# Patient Record
Sex: MALE | URBAN - METROPOLITAN AREA
[De-identification: names, ages, dates, MRNs, and addresses within clinical notes are randomized per-mention and may not be internally consistent; named-entity substitution may affect disease eponyms.]

---

## 2023-10-13 ENCOUNTER — EMERGENCY (EMERGENCY)
Age: 23
LOS: 1 days | Discharge: HOME | End: 2023-10-13
Attending: STUDENT IN AN ORGANIZED HEALTH CARE EDUCATION/TRAINING PROGRAM | Admitting: STUDENT IN AN ORGANIZED HEALTH CARE EDUCATION/TRAINING PROGRAM
Payer: OTHER GOVERNMENT

## 2023-10-13 VITALS
DIASTOLIC BLOOD PRESSURE: 81 MMHG | RESPIRATION RATE: 18 BRPM | OXYGEN SATURATION: 96 % | SYSTOLIC BLOOD PRESSURE: 137 MMHG | HEART RATE: 91 BPM | TEMPERATURE: 98 F

## 2023-10-13 VITALS
HEART RATE: 97 BPM | DIASTOLIC BLOOD PRESSURE: 84 MMHG | OXYGEN SATURATION: 98 % | RESPIRATION RATE: 18 BRPM | TEMPERATURE: 98 F | SYSTOLIC BLOOD PRESSURE: 138 MMHG | WEIGHT: 211.97 LBS

## 2023-10-13 PROCEDURE — 99283 EMERGENCY DEPT VISIT LOW MDM: CPT

## 2023-10-13 RX ORDER — CLOBAZAM 10 MG/1
1 TABLET ORAL
Qty: 90 | Refills: 0
Start: 2023-10-13 | End: 2023-11-11

## 2023-10-13 RX ORDER — LAMOTRIGINE 25 MG/1
1 TABLET, ORALLY DISINTEGRATING ORAL
Qty: 60 | Refills: 0
Start: 2023-10-13 | End: 2023-11-11

## 2023-10-13 RX ORDER — LAMOTRIGINE 25 MG/1
1 TABLET, ORALLY DISINTEGRATING ORAL
Qty: 30 | Refills: 0
Start: 2023-10-13 | End: 2023-11-11

## 2023-10-13 NOTE — ED PROVIDER NOTE - PATIENT PORTAL LINK FT
You can access the FollowMyHealth Patient Portal offered by [facility] by registering at the following website: [email or web address]. By joining [email or web address]’s FollowMyHealth portal, you will also be able to view your health information using other applications (apps) compatible with our system.

## 2023-10-13 NOTE — ED PROVIDER NOTE - OBJECTIVE STATEMENT
21 yo M with h/o seizure disorder who presents to the ED requesting medication refill. Pt takes 23 yo M with h/o seizure disorder who presents to the ED requesting medication refill. Pt takes lamotrigine and clobazam for seizure disorder. he currently lives in [place] but is in [place] for 6 months while his father has a temporary job up here. They were trying to get refills through physician in [place] but that physician was unable to prescribe medication to [place]. He notes that he has doses for tomorrow morning but after that is out. Takes lamotrigine 200 mg once a day and clobazam 10 mg in the morning and 20 mg at night. Has small pause seizures throughout the day but has not had a grand mal seizure in multiple years. Pt has no other current medical complaints.

## 2023-10-13 NOTE — ED ADULT NURSE NOTE - OBJECTIVE STATEMENT
Patient received in wellness, exam room 1. Patient A&Ox3 and ambulatory at baseline. Patient presents to the ED for medications refill. 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 Patient states he is visiting from [place] and running low on his Clobazam (10mg) and Lamotrigine (200mg); patient states he just needs a refill of medications for remaining of trip. Patient denies headache, dizziness, nausea/vomiting, fever/chills, and pain. Patient offers no complaints at this time. Respirations even and unlabored, no signs/symptoms of acute distress; patient denies dyspnea, shortness of breath, and chest pain. Patient is stable at this time. Steady gait observed.

## 2023-10-13 NOTE — ED PROVIDER NOTE - NSFOLLOWUPCLINICS_GEN_ALL_ED_FT
[facility]  Neurology  [address] [place] [address] [address]rd [place] [address]  Phone: [phone]  Fax:

## 2023-10-13 NOTE — ED POST DISCHARGE NOTE - DETAILS
pt's father called for pt's prescription of clobazam not available at the previous pharmacy, and requesting to send to another [facility]. also lamotrigine has to be one tablet twice a day instead of once a day as the prescription was "can take up to 2 times a day as needed". medications have been previously verified by ER team. prescription sent and verified with pharmacy on the phone.

## 2023-10-13 NOTE — ED ADULT TRIAGE NOTE - CHIEF COMPLAINT QUOTE
Pt brought to Adult ED from [facility] ED, accompanied by father. Pt is visiting from [place] and ran out of medication Clobazam. Pt requesting medication refill until return back to [place]. Phx seizure, stroke with left leg residual weakness. Denies any medical complaints.

## 2023-10-13 NOTE — ED PROVIDER NOTE - PROGRESS NOTE DETAILS
Dr. Reyes: Pt signed out to me by Dr. Gimenez. Pt home dose medication confirmed with outpatient pharmacy. Will send rx for seizure prevention and recommend continued outpatient follow up.

## 2023-10-13 NOTE — ED PEDIATRIC TRIAGE NOTE - CHIEF COMPLAINT QUOTE
23yo male visiting from [place] here for medication refill, pmh seizure disorder and stroke, no seizure activity today, nka,

## 2023-10-13 NOTE — ED PROVIDER NOTE - CLINICAL SUMMARY MEDICAL DECISION MAKING FREE TEXT BOX
Dosages for medication verified with [facility] in [place] ([phone]). Clobazam last refilled on 9/7/2023 with 30 day supply. 21 yo M with h/o seizure disorder who presents to the ED requesting refill of seizure medication lamotrigine and clobazam. Lives in [place] but is in [place] for 6 months and medication runs out tomorrow.   Dosages for medication verified with [facility] in [place] ([phone])- lamotrigine 200 mg once a day and clobazam, 10 mg in the morning and 20 mg at night. Clobazam last refilled on 9/7/2023 with 30 day supply.

## 2023-10-13 NOTE — ED ADULT NURSE NOTE - NSFALLUNIVINTERV_ED_ALL_ED
Bed/Stretcher in lowest position, wheels locked, appropriate side rails in place/Call bell, personal items and telephone in reach/Instruct patient to call for assistance before getting out of bed/chair/stretcher/Non-slip footwear applied when patient is off stretcher/[place] to call system/Physically safe environment - no spills, clutter or unnecessary equipment/Purposeful proactive rounding/Room/bathroom lighting operational, light cord in reach

## 2023-10-13 NOTE — ED ADULT NURSE NOTE - CHIEF COMPLAINT QUOTE
Pt brought to Adult ED from [facility] ED, accompanied by father. Pt is visiting from [place] and ran out of medication Clobazam. Pt requesting medication refill until return back to [place]. Phx seizure, stroke with left leg residual weakness. Denies any medical complaints.

## 2023-10-13 NOTE — ED STATDOCS - OBJECTIVE STATEMENT
23 yo with seizure disorder visiting from [place] who is running out of his seizure medication.    I performed a medical screening examination and determined this patient to be medically stable and will transfer to the [facility] adult ED for further care. heart and lung exam done and both did not reveal concerns for immediate intervention. Request for transfer relayed to [facility] ED attending who accepted case. Process explained to patient/parent prior to transfer.

## 2023-11-01 PROBLEM — R56.9 UNSPECIFIED CONVULSIONS: Chronic | Status: ACTIVE | Noted: 2023-10-13

## 2023-11-02 PROBLEM — Z00.00 ENCOUNTER FOR PREVENTIVE HEALTH EXAMINATION: Status: ACTIVE | Noted: 2023-11-02

## 2023-11-07 ENCOUNTER — TRANSCRIPTION ENCOUNTER (OUTPATIENT)
Age: 23
End: 2023-11-07

## 2023-11-07 ENCOUNTER — APPOINTMENT (OUTPATIENT)
Dept: NEUROLOGY | Facility: CLINIC | Age: 23
End: 2023-11-07
Payer: OTHER GOVERNMENT

## 2023-11-07 ENCOUNTER — NON-APPOINTMENT (OUTPATIENT)
Age: 23
End: 2023-11-07

## 2023-11-07 VITALS
HEIGHT: 76 IN | SYSTOLIC BLOOD PRESSURE: 142 MMHG | HEART RATE: 86 BPM | BODY MASS INDEX: 24.36 KG/M2 | WEIGHT: 200 LBS | DIASTOLIC BLOOD PRESSURE: 78 MMHG

## 2023-11-07 DIAGNOSIS — G40.911 EPILEPSY, UNSPECIFIED, INTRACTABLE, WITH STATUS EPILEPTICUS: ICD-10-CM

## 2023-11-07 PROCEDURE — 99205 OFFICE O/P NEW HI 60 MIN: CPT

## 2023-11-07 PROCEDURE — 95816 EEG AWAKE AND DROWSY: CPT

## 2023-11-07 RX ORDER — DIVALPROEX SODIUM 500 1/1
500 TABLET, EXTENDED RELEASE ORAL
Qty: 540 | Refills: 2 | Status: ACTIVE | COMMUNITY
Start: 2023-11-07 | End: 1900-01-01

## 2023-11-07 RX ORDER — DIVALPROEX SODIUM 500 MG/1
500 TABLET, DELAYED RELEASE ORAL
Refills: 0 | Status: DISCONTINUED | COMMUNITY
End: 2023-11-07

## 2023-11-08 LAB
ALBUMIN SERPL ELPH-MCNC: 4.5 G/DL
ALP BLD-CCNC: 77 U/L
ALT SERPL-CCNC: 38 U/L
ANION GAP SERPL CALC-SCNC: 13 MMOL/L
AST SERPL-CCNC: 37 U/L
BILIRUB SERPL-MCNC: 0.2 MG/DL
BUN SERPL-MCNC: 12 MG/DL
CALCIUM SERPL-MCNC: 9.8 MG/DL
CHLORIDE SERPL-SCNC: 105 MMOL/L
CO2 SERPL-SCNC: 25 MMOL/L
CREAT SERPL-MCNC: 1.02 MG/DL
EGFR: 107 ML/MIN/1.73M2
GLUCOSE SERPL-MCNC: 73 MG/DL
HCT VFR BLD CALC: 43.7 %
HGB BLD-MCNC: 14.6 G/DL
MCHC RBC-ENTMCNC: 32.7 PG
MCHC RBC-ENTMCNC: 33.4 GM/DL
MCV RBC AUTO: 97.8 FL
PLATELET # BLD AUTO: 155 K/UL
POTASSIUM SERPL-SCNC: 4.6 MMOL/L
PROT SERPL-MCNC: 6.6 G/DL
RBC # BLD: 4.47 M/UL
RBC # FLD: 13.8 %
SODIUM SERPL-SCNC: 143 MMOL/L
VALPROATE SERPL-MCNC: 100 UG/ML
WBC # FLD AUTO: 6.13 K/UL

## 2023-11-12 ENCOUNTER — NON-APPOINTMENT (OUTPATIENT)
Age: 23
End: 2023-11-12

## 2023-11-12 LAB
CLOBAZAM + NOR PNL SERPL: 166 NG/ML
DESMETHYLCLOBAZAM: 441 NG/ML
LAMOTRIGINE SERPL-MCNC: 15.3 UG/ML

## 2023-11-13 ENCOUNTER — TRANSCRIPTION ENCOUNTER (OUTPATIENT)
Age: 23
End: 2023-11-13

## 2023-12-20 ENCOUNTER — EMERGENCY (EMERGENCY)
Facility: HOSPITAL | Age: 23
LOS: 1 days | Discharge: HOME | End: 2023-12-20
Attending: EMERGENCY MEDICINE | Admitting: EMERGENCY MEDICINE
Payer: OTHER GOVERNMENT

## 2023-12-20 VITALS
DIASTOLIC BLOOD PRESSURE: 72 MMHG | SYSTOLIC BLOOD PRESSURE: 146 MMHG | RESPIRATION RATE: 16 BRPM | OXYGEN SATURATION: 99 % | TEMPERATURE: 98 F | HEART RATE: 85 BPM

## 2023-12-20 PROCEDURE — 25622 CLTX CARPL SCPHD FX W/O MNPJ: CPT | Mod: 54,RT

## 2023-12-20 PROCEDURE — 99284 EMERGENCY DEPT VISIT MOD MDM: CPT | Mod: 57

## 2023-12-20 NOTE — ED PROVIDER NOTE - CLINICAL SUMMARY MEDICAL DECISION MAKING FREE TEXT BOX
23 year old male presenting with a diagnosed right scaphoid fracture and nondisplaced radial fracture.  States he was seen in Urgent Care last night, sent for an X-Ray and called today to have a splint applied.  Patient reports he was walking at the gym and accidentally tripped, landed onto right outstretched hands.  Denies weakness, numbness or tingling.  Limited range of motion due to pain but is able to move extremity.  Denies any other injuries or trauma other than as stated.  Denies any other acute complaints.    Reviewed X-Ray images and Official report provided by Patient's father.  Consistent with Right nondisplaced radial and scaphoid fracture.  Patient took Tylenol prior to arrival, reports pain is controlled at this time.    Will splint and set up follow up appointment.

## 2023-12-20 NOTE — ED PROVIDER NOTE - PATIENT PORTAL LINK FT
You can access the FollowMyHealth Patient Portal offered by [facility] by registering at the following website: [email or web address]. By joining [employer]’s FollowMyHealth portal, you will also be able to view your health information using other applications (apps) compatible with our system. You can access the FollowMyHealth Patient Portal offered by [facility] by registering at the following website: [email or web address]. By joining [employer]’s FollowMyHealth portal, you will also be able to view your health information using other applications (apps) compatible with our system.

## 2023-12-20 NOTE — ED PROVIDER NOTE - NSFOLLOWUPINSTRUCTIONS_ED_ALL_ED_FT
You have a fracture of your right wrist.   Keep the splint on as discussed for immobilization until you follow up with an Orthopedic Hand Specialist. You have a fracture of your right wrist.   Keep the splint on as discussed for immobilization until you follow up with an Orthopedic Hand Specialist as scheduled within the next week.    You may apply ice with a barrier protection 3-4 times daily for 10-15 minutes at a time.  Unless there are any contraindications, you may take Tylenol and/or Motrin as needed for pain.  Take as instructed on the bottle.    Follow up with your Primary Care Physician as needed.    Return to the Emergency Department immediately for any weakness, numbness or tingling, worsening pain, or any other acute complaints, or concerns. You have a fracture of your right wrist.   Keep the splint on as discussed for immobilization until you follow up with an Orthopedic Hand Specialist as scheduled within the next week.    You may apply ice with a barrier protection 3-4 times daily for 10-15 minutes at a time.  Unless there are any contraindications, you may take Tylenol and/or Motrin as needed for pain.  Take as instructed on the bottle.  Avoid heavy lifting.  Avoid strenuous activity. Keep extremity elevated.    Follow up with your Primary Care Physician as needed.    Return to the Emergency Department immediately for any weakness, numbness or tingling, worsening pain, or any other acute complaints, or concerns.

## 2023-12-20 NOTE — ED PROVIDER NOTE - MUSCULOSKELETAL MINIMAL EXAM
Right wrist/hand; overlying skin intact without erythema or ecchymosis.  Wrist tender and swollen with snuffbox tenderness and tenderness over the dorsal aspect, no crepitus.  Limited active ROM due to pain but able to perform flexion and extension.  Pain with flexion and ulnar/radial deviation.  Opposition intact.  Full  strength.  Distal sensation intact, peripheral pulses present.  Capillary refill < 2 seconds.  Remaining joints and RUE intact with no other injuries or trauma, no other pain or tenderness.

## 2023-12-20 NOTE — ED ADULT TRIAGE NOTE - CHIEF COMPLAINT QUOTE
Pt. states he fell last night while walking into the gym. Sent by  for fracture to right wrist and elbow. Denies head trauma or LOC.

## 2023-12-20 NOTE — ED ADULT NURSE NOTE - OBJECTIVE STATEMENT
pt received to intake with father and is ambulatory. respirations even and unlabored. pt assessed by provider and D/C prior to further evaluation.

## 2023-12-20 NOTE — ED PROVIDER NOTE - OBJECTIVE STATEMENT
23 year old male presenting with a diagnosed right scaphoid fracture and nondisplaced radial fracture.  States he was seen in Urgent Care last night, sent for an X-Ray and called today to have a splint applied.  Patient reports he was walking at the gym and accidentally tripped, landed onto right outstretched hands.  Denies weakness, numbness or tingling.  Limited range of motion due to pain but is able to move extremity.  Denies any other injuries or trauma other than as stated.  Denies any other acute complaints.

## 2023-12-20 NOTE — ED PROVIDER NOTE - PROGRESS NOTE DETAILS
CECILIA Troy:    Follow up Ortho appointment provided with Patient.  Splint applied.  Patient comfortable with discharge home.

## 2023-12-22 ENCOUNTER — APPOINTMENT (OUTPATIENT)
Dept: ORTHOPEDIC SURGERY | Facility: CLINIC | Age: 23
End: 2023-12-22
Payer: OTHER GOVERNMENT

## 2023-12-22 ENCOUNTER — NON-APPOINTMENT (OUTPATIENT)
Age: 23
End: 2023-12-22

## 2023-12-22 VITALS
SYSTOLIC BLOOD PRESSURE: 138 MMHG | WEIGHT: 210 LBS | HEIGHT: 76 IN | HEART RATE: 75 BPM | DIASTOLIC BLOOD PRESSURE: 81 MMHG | BODY MASS INDEX: 25.57 KG/M2

## 2023-12-22 PROCEDURE — 73110 X-RAY EXAM OF WRIST: CPT | Mod: RT

## 2023-12-22 PROCEDURE — 99203 OFFICE O/P NEW LOW 30 MIN: CPT | Mod: 25

## 2023-12-22 PROCEDURE — 73130 X-RAY EXAM OF HAND: CPT | Mod: RT

## 2023-12-22 PROCEDURE — 29075 APPL CST ELBW FNGR SHORT ARM: CPT | Mod: RT

## 2023-12-22 NOTE — REVIEW OF SYSTEMS
[Right] : right [Negative] : Allergic/Immunologic [de-identified] : History of a stroke with left-sided weakness.

## 2023-12-22 NOTE — HISTORY OF PRESENT ILLNESS
[Right] : right hand dominant [FreeTextEntry1] :  He comes in today for evaluation of right hand injury after a fall in the gym. Pt went to a walk in clinic in [place]. He went to [facility] ER 2 days ago and had x-rays and was told to have a fracture. He rates his pain as a 4/10 and is taking ibuprofen as needed for the pain. He denies any numbness/tingling.  He has a history of a stroke in 2018. With regard to the stroke he had in 2018, he has weakness in his left arm and a left drop foot.   He is accompanied by his mother today.

## 2023-12-22 NOTE — END OF VISIT
[FreeTextEntry3] : This note was written by Micheal Wetzel on 12/22/2023 acting solely as a scribe for Dr. Segun Delgado.   All medical record entries made by the Scribe were at my, Dr. Segun Delgado, direction and personally dictated by me on 12/22/2023. I have personally reviewed the chart and agree that the record accurately reflects my personal performance of the history, physical exam, assessment and plan.

## 2023-12-22 NOTE — PROCEDURE
[FreeTextEntry1] : He was placed into a well-padded and well-molded right short arm thumb spica cast. He was instructed on cast care, elevation and range of motion exercises to the digits. He will follow-up in 3 for x-rays out of plaster.

## 2023-12-22 NOTE — PHYSICAL EXAM
[de-identified] : - Constitutional: This is a male in no obvious distress. He is accompanied by his mother.   - Psych: Patient is alert and oriented to person, place and time.  Patient has a normal mood and affect. - Cardiovascular: Normal pulses throughout the upper extremities.  No significant varicosities are noted in the upper extremities. - Respiratory:  Patient exhibits no evidence of shortness of breath or difficulty breathing. - Skin: No rashes, lesions, or other abnormalities are noted in the upper extremities. ---  Examination of his right wrist and hand after the splint was removed demonstrates swelling and tenderness on the distal radius dorsally.  He also has tenderness along the snuffbox.  There is no localized swelling or tenderness along the scapholunate interval.  He has appropriate flexion and extension of the digits.  He is neurovascularly intact distally. [de-identified] : PA, lateral, and oblique radiographs of the right wrist and hand demonstrate a nondisplaced distal radius fracture and a non-displaced scaphoid waist fracture primarily involving the radial cortex..

## 2023-12-22 NOTE — DISCUSSION/SUMMARY
[FreeTextEntry1] :  He has findings consistent with a nondisplaced intra-articular right distal radius fracture and a nondisplaced right scaphoid waist fracture after a fall 3 days ago.   I had a discussion with the patient and mother regarding today's visit, the prognosis of this diagnosis, and treatment recommendations and options.   After discussion of treatment options, he and his mother agreed to nonoperative management and placement of a thumb spica cast today.   The patient has agreed to the above plan of management and has expressed full understanding. All questions were fully answered to the patient's satisfaction.   My cumulative time spent on this visit included: Preparation for the visit, review of the medical records, review of pertinent diagnostic studies, examination and counseling of the patient on the above diagnosis, treatment plan and prognosis, orders of diagnostic tests, medication and/or appropriate procedures and documentation in the medical records of today's visit.

## 2023-12-22 NOTE — ADDENDUM
[FreeTextEntry1] :  I, Micheal Wetzel, acted solely as a scribe for Dr. Delgado on this date on 12/22/2023.

## 2024-01-03 ENCOUNTER — NON-APPOINTMENT (OUTPATIENT)
Age: 24
End: 2024-01-03

## 2024-01-12 ENCOUNTER — APPOINTMENT (OUTPATIENT)
Dept: ORTHOPEDIC SURGERY | Facility: CLINIC | Age: 24
End: 2024-01-12
Payer: OTHER GOVERNMENT

## 2024-01-12 PROCEDURE — 99213 OFFICE O/P EST LOW 20 MIN: CPT

## 2024-01-12 PROCEDURE — 73110 X-RAY EXAM OF WRIST: CPT | Mod: RT

## 2024-01-12 NOTE — PHYSICAL EXAM
[de-identified] : - Constitutional: This is a male in no obvious distress. He is accompanied by his father.   - Psych: Patient is alert and oriented to person, place and time.  Patient has a normal mood and affect. - Cardiovascular: Normal pulses throughout the upper extremities.  No significant varicosities are noted in the upper extremities. - Respiratory:  Patient exhibits no evidence of shortness of breath or difficulty breathing. - Skin: No rashes, lesions, or other abnormalities are noted in the upper extremities. ---  Examination of his right wrist and hand after the cast was removed demonstrates decreased swelling.  Is no longer tender along the distal radius.  There is residual although decreased tenderness along the snuffbox.  He has appropriate flexion and extension of the digit.  He is neurovascularly intact distally. [de-identified] :  PA, lateral, and oblique radiographs of the right wrist demonstrate his distal radius fracture to be healing with callus callus and healing of his scaphoid fracture.

## 2024-01-12 NOTE — DISCUSSION/SUMMARY
[FreeTextEntry1] : I had a discussion regarding today's visit, the diagnosis and treatment recommendations and options.  We also discussed changes since the last visit.  At this time, I discussed options of being casted for 3 weeks or wearing a right thumb spica splint full time. He defers being casted and opted to wear a right thumb spica splint.  I recommended full-time splinting although he can remove the splint while bathing.  Otherwise, he needs to wear 24 hours a day.  He will follow up in 2 weeks.   The patient and his father have agreed to the above plan of management and has expressed full understanding.  All questions were fully answered to the patient's satisfaction.  My cumulative time spent on today's visit was greater than 30 minutes and included: Preparation for the visit, review of the medical records, review of pertinent diagnostic studies, examination and counseling of the patient on the above diagnosis, treatment plan and prognosis, orders of diagnostic tests, medications and/or appropriate procedures and documentation in the medical records of today's visit.

## 2024-01-12 NOTE — HISTORY OF PRESENT ILLNESS
[FreeTextEntry1] : 24 days status post fall resulting in nondisplaced intra-articular right distal radius fracture and a nondisplaced right scaphoid waist fracture.  See note from when he was seen in the office 3 weeks ago.  He was casted.  He is overall doing well and denies any pain.   He has a history of a stroke in 2018. With regard to the stroke he had in 2018, he has weakness in his left arm and a left drop foot.   He is accompanied by his father today.

## 2024-01-17 ENCOUNTER — NON-APPOINTMENT (OUTPATIENT)
Age: 24
End: 2024-01-17

## 2024-01-17 RX ORDER — LAMOTRIGINE 200 MG/1
200 TABLET, EXTENDED RELEASE ORAL
Qty: 180 | Refills: 2 | Status: ACTIVE | COMMUNITY
Start: 2023-11-07 | End: 1900-01-01

## 2024-01-26 ENCOUNTER — APPOINTMENT (OUTPATIENT)
Dept: ORTHOPEDIC SURGERY | Facility: CLINIC | Age: 24
End: 2024-01-26
Payer: OTHER GOVERNMENT

## 2024-01-26 PROCEDURE — 99214 OFFICE O/P EST MOD 30 MIN: CPT

## 2024-01-26 PROCEDURE — 73120 X-RAY EXAM OF HAND: CPT | Mod: RT

## 2024-01-26 NOTE — DISCUSSION/SUMMARY
[FreeTextEntry1] : I had a discussion regarding today's visit, the diagnosis and treatment recommendations and options.  We also discussed changes since the last visit.  At this time, I discussed that he could ween off of wearing the splint, since his distal radius fracture is nearly healed and in good alignment. I discussed that he can do light weightlifting, but if he feels any discomfort, he was advised to stop. He will follow up in 3 weeks.   The patient and his father have agreed to the above plan of management and has expressed full understanding.  All questions were fully answered to the patient's satisfaction.  My cumulative time spent on today's visit was greater than 30 minutes and included: Preparation for the visit, review of the medical records, review of pertinent diagnostic studies, examination and counseling of the patient on the above diagnosis, treatment plan and prognosis, orders of diagnostic tests, medications and/or appropriate procedures and documentation in the medical records of today's visit.

## 2024-01-26 NOTE — PHYSICAL EXAM
[de-identified] :  PA, lateral, oblique and ulnar deviation radiographs of the right wrist demonstrate distal radius fracture to be healed and not displaced and his scaphoid waist fracture to be nearly healed in good alignment.   PA, lateral, and oblique radiographs of the right wrist demonstrate his distal radius fracture to be healing with callus callus and healing of his scaphoid fracture.  [de-identified] : - Constitutional: This is a male in no obvious distress. He is accompanied by his father.   - Psych: Patient is alert and oriented to person, place and time.  Patient has a normal mood and affect. - Cardiovascular: Normal pulses throughout the upper extremities.  No significant varicosities are noted in the upper extremities. - Respiratory:  Patient exhibits no evidence of shortness of breath or difficulty breathing. - Skin: No rashes, lesions, or other abnormalities are noted in the upper extremities. ---  Examination of his right wrist and hand after the splint was removed demonstrates decreased swelling.  Is no longer tender along the distal radius.  There is very minimal residual tenderness along the snuffbox.  He has improved flexion and extension of the digit.  He has approximately 45 degrees of wrist flexion and extension with soft endpoints.  He is neurovascularly intact distally.

## 2024-01-26 NOTE — HISTORY OF PRESENT ILLNESS
[FreeTextEntry1] : 38 days status post fall resulting in nondisplaced intra-articular right distal radius fracture and a nondisplaced right scaphoid waist fracture.  He is overall doing well today but states that his right wrist is tender to touch. He rates his pain as a 2/10.   He has a history of a stroke in 2018. With regard to the stroke he had in 2018, he has weakness in his left arm and a left drop foot.   He is accompanied by his father today.

## 2024-02-07 ENCOUNTER — NON-APPOINTMENT (OUTPATIENT)
Age: 24
End: 2024-02-07

## 2024-02-09 PROBLEM — S62.024A CLOSED NONDISPLACED FRACTURE OF MIDDLE THIRD OF SCAPHOID BONE OF RIGHT WRIST, INITIAL ENCOUNTER: Status: ACTIVE | Noted: 2023-12-22

## 2024-02-09 PROBLEM — S52.571A OTHER CLOSED INTRA-ARTICULAR FRACTURE OF DISTAL END OF RIGHT RADIUS, INITIAL ENCOUNTER: Status: ACTIVE | Noted: 2023-12-22

## 2024-02-16 ENCOUNTER — APPOINTMENT (OUTPATIENT)
Dept: ORTHOPEDIC SURGERY | Facility: CLINIC | Age: 24
End: 2024-02-16
Payer: OTHER GOVERNMENT

## 2024-02-16 DIAGNOSIS — S52.571A OTHER INTRAARTICULAR FRACTURE OF LOWER END OF RIGHT RADIUS, INITIAL ENCOUNTER FOR CLOSED FRACTURE: ICD-10-CM

## 2024-02-16 DIAGNOSIS — S62.024A NONDISPLACED FRACTURE OF MIDDLE THIRD OF NAVICULAR [SCAPHOID] BONE OF RIGHT WRIST, INITIAL ENCOUNTER FOR CLOSED FRACTURE: ICD-10-CM

## 2024-02-16 PROCEDURE — 73110 X-RAY EXAM OF WRIST: CPT | Mod: RT

## 2024-02-16 PROCEDURE — 99213 OFFICE O/P EST LOW 20 MIN: CPT

## 2024-02-16 NOTE — HISTORY OF PRESENT ILLNESS
[FreeTextEntry1] : 59 days status post fall resulting in nondisplaced intra-articular right distal radius fracture and a nondisplaced right scaphoid waist fracture.  He is overall doing well today.   He has a history of a stroke in 2018. With regard to the stroke he had in 2018, he has weakness in his left arm and a left drop foot.   He is accompanied by his father today.

## 2024-02-16 NOTE — ADDENDUM
[FreeTextEntry1] :  I, Micheal Wetzel, acted solely as a scribe for Dr. Delgado on this date on 02/16/2024.

## 2024-02-16 NOTE — END OF VISIT
[FreeTextEntry3] : This note was written by Micheal Wetzel on 02/16/2024 acting solely as a scribe for Dr. Segun Delgado.   All medical record entries made by the Scribe were at my, Dr. Segun Delgado, direction and personally dictated by me on 02/16/2024. I have personally reviewed the chart and agree that the record accurately reflects my personal performance of the history, physical exam, assessment and plan.

## 2024-02-16 NOTE — DISCUSSION/SUMMARY
[FreeTextEntry1] : I had a discussion regarding today's visit, the diagnosis and treatment recommendations and options.  We also discussed changes since the last visit.  At this time, he was instructed to discontinue splinting. I told him that he can begin more aggressive mobility exercises as well as strengthening exercises.  He will advance his activities, according to his symptoms.  He will follow up as needed.   The patient and his father have agreed to the above plan of management and has expressed full understanding.  All questions were fully answered to the patient's satisfaction.  My cumulative time spent on today's visit was greater than 30 minutes and included: Preparation for the visit, review of the medical records, review of pertinent diagnostic studies, examination and counseling of the patient on the above diagnosis, treatment plan and prognosis, orders of diagnostic tests, medications and/or appropriate procedures and documentation in the medical records of today's visit.

## 2024-02-16 NOTE — PHYSICAL EXAM
[de-identified] : - Constitutional: This is a male in no obvious distress. He is accompanied by his father.   - Psych: Patient is alert and oriented to person, place and time.  Patient has a normal mood and affect. - Cardiovascular: Normal pulses throughout the upper extremities.  No significant varicosities are noted in the upper extremities. - Respiratory:  Patient exhibits no evidence of shortness of breath or difficulty breathing. - Skin: No rashes, lesions, or other abnormalities are noted in the upper extremities. ---  Examination of his right wrist and hand demonstrates no residual swelling.  He is no longer tender along the distal radius.  There is no residual tenderness along the snuffbox.  He has full flexion and extension of the digits and improved flexion and extension of the wrist.  He is neurovascularly intact distally. [de-identified] :  PA, lateral, oblique and ulnar deviation radiographs of the right wrist demonstrate distal radius fracture and scaphoid fracture appear well healed and in good alignment.

## 2024-02-22 ENCOUNTER — APPOINTMENT (OUTPATIENT)
Dept: NEUROLOGY | Facility: CLINIC | Age: 24
End: 2024-02-22
Payer: OTHER GOVERNMENT

## 2024-02-22 PROCEDURE — G2211 COMPLEX E/M VISIT ADD ON: CPT

## 2024-02-22 PROCEDURE — 99213 OFFICE O/P EST LOW 20 MIN: CPT

## 2024-03-05 ENCOUNTER — TRANSCRIPTION ENCOUNTER (OUTPATIENT)
Age: 24
End: 2024-03-05

## 2024-03-21 ENCOUNTER — APPOINTMENT (OUTPATIENT)
Dept: FAMILY MEDICINE | Facility: CLINIC | Age: 24
End: 2024-03-21

## 2024-03-21 ENCOUNTER — TRANSCRIPTION ENCOUNTER (OUTPATIENT)
Age: 24
End: 2024-03-21

## 2024-04-02 NOTE — ED PROVIDER NOTE - TOBACCO USE
[facility]  Vaccine Consent Form    Patient Name:  Leydi Chen :  2020     Vaccine(s) Ordered    HiB PRP-T Conjugate Vaccine 4 Dose IM        Screening Checklist  The following questions should be completed prior to vaccination. If you answer “yes” to any question, it does not necessarily mean you should not be vaccinated. It just means we may need to clarify or ask more questions. If a question is unclear, please ask your healthcare provider to explain it.    Yes No   Any fever or moderate to severe illness today (mild illness and/or antibiotic treatment are not contraindications)?     Do you have a history of a serious reaction to any previous vaccinations, such as anaphylaxis, encephalopathy within 7 days, Guillain-Sutton syndrome within 6 weeks, seizure?     Have you received any live vaccine(s) (e.g MMR, BOOKER) or any other vaccines in the last month (to ensure duplicate doses aren't given)?     Do you have an anaphylactic allergy to latex (DTaP, DTaP-IPV, Hep A, Hep B, MenB, RV, Td, Tdap), baker’s yeast (Hep B, HPV), polysorbates (RSV, nirsevimab, PCV 20, Rotavirrus, Tdap, Shingrix), or gelatin (BOOKER, MMR)?     Do you have an anaphylactic allergy to neomycin (Rabies, BOOKER, MMR, IPV, Hep A), polymyxin B (IPV), or streptomycin (IPV)?      Any cancer, leukemia, AIDS, or other immune system disorder? (BOOKER, MMR, RV)     Do you have a parent, brother, or sister with an immune system problem (if immune competence of vaccine recipient clinically verified, can proceed)? (MMR, BOOKER)     Any recent steroid treatments for >2 weeks, chemotherapy, or radiation treatment? (BOOKER, MMR)     Have you received antibody-containing blood transfusions or IVIG in the past 11 months (recommended interval is dependent on product)? (MMR, BOOKER)     Have you taken antiviral drugs (acyclovir, famciclovir, valacyclovir for BOOKER) in the last 24 or 48 hours, respectively?      Are you pregnant or planning to become pregnant  "within 1 month? (BOOKER, MMR, HPV, IPV, MenB, Abrexvy; For Hep B- refer to Engerix-B; For RSV - Abrysvo is indicated for 32-36 weeks of pregnancy from September to January)     For infants, have you ever been told your child has had intussusception or a medical emergency involving obstruction of the intestine (Rotavirus)? If not for an infant, can skip this question.         *Ordering Physicians/APC should be consulted if \"yes\" is checked by the patient or guardian above.  I have received, read, and understand the Vaccine Information Statement (VIS) for each vaccine ordered.  I have considered my or my child's health status as well as the health status of my close contacts.  I have taken the opportunity to discuss my vaccine questions with my or my child's health care provider.   I have requested that the ordered vaccine(s) be given to me or my child.  I understand the benefits and risks of the vaccines.  I understand that I should remain in the clinic for 15 minutes after receiving the vaccine(s).  _________________________________________________________  Signature of Patient or Parent/Legal Guardian ____________________  Date     " Never smoker

## 2024-04-09 ENCOUNTER — TRANSCRIPTION ENCOUNTER (OUTPATIENT)
Age: 24
End: 2024-04-09

## 2024-05-01 NOTE — ED PEDIATRIC TRIAGE NOTE - BP NONINVASIVE SYSTOLIC (MM HG)
May 1, 2024        Ron Gilmore  [address]  [place] [address]          Dear Ron Gilmore:    You were seen in the [facility] Emergency Department at [facility] on 4/28/2024.  We are unable to reach you by phone, so we are sending you this letter.     It is important that you call [facility] Emergency Department lab result nurse at [phone], as we have information to relay to you AND/OR we MAY have to make some changes in your treatment.    Best time to call back is between 9AM and 5:30PM, 7 days a week.      Sincerely,     [facility] Emergency Department Lab Result RN  [phone]   138

## 2024-05-06 ENCOUNTER — NON-APPOINTMENT (OUTPATIENT)
Age: 24
End: 2024-05-06

## 2024-05-07 ENCOUNTER — TRANSCRIPTION ENCOUNTER (OUTPATIENT)
Age: 24
End: 2024-05-07

## 2024-05-12 NOTE — HISTORY OF PRESENT ILLNESS
[FreeTextEntry1] : ***UPDATE:2/22/2024*** Appointment was conducted by audiovisual/telehealth at request of patient in place of a follow up office visit due to heightened concern for Corona virus infection risk. Verbal consent given on Feb 22, 2024 0:02 PM by the patient Mr. LOBITO MUSA Oct 15 2000 who understands that the telephone visit will be charged to insurance and may involve co-pay for patient Nurse Practitioner location:office Patient location:home Individuals on call: MAYRA Clarke, Mr. LOBITO Musa continues to do well with no interval seizures     Lamotrigine ER 200mg BID, Clobazam 10/20 and Divalproex ER 500mg  3 tabs BID  ******* Lobito Musa is a 23y year old man with a history of sinus infection at age 18 with secondary meningitis requiring brain surgery who comes in for epilepsy. 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 Since onset, has had short stares/grunts with no GTCs in several years. Has been tried on several ASMs (of which names cannot be recalled) and is currently on Onfi 10/20, VPA 1500bid (ER), LTG  bid.   Saw physician who recently increased VPA ER from 1500 daily to bid with no change in his LTG. On questioning, does have slight tremor. Has several short staring seizures per month which were 10-20/day at worst and more recently a few times per week. After being increased on VPA, had no further episodes until missed Onfi dose two days ago when had a few episodes.   Has spoken to neurologists in past with question of epilepsy surgery. Has not had SEEG/intracranial monitoring. Imaging done in past, but has recently moved from [place] to [place] and briefly in [place] for 6 months due to father's work.   PMHx: as above SocHx: does not drive FHx: no history seizures All: NKDA Meds: as above

## 2024-05-12 NOTE — PHYSICAL EXAM
[Impaired Insight] : insight and judgment were intact [Oriented To Time, Place, And Person] : oriented to person, place, and time [Affect] : the affect was normal [Person] : oriented to person [Place] : oriented to place [Time] : oriented to time [Visual Intact] : visual attention was ~T not ~L decreased [Concentration Intact] : normal concentrating ability [Naming Objects] : no difficulty naming common objects [Repeating Phrases] : no difficulty repeating a phrase [Writing A Sentence] : no difficulty writing a sentence [Fluency] : fluency intact [Comprehension] : comprehension intact [Reading] : reading intact [Past History] : adequate knowledge of personal past history [Cranial Nerves Optic (II)] : visual acuity intact bilaterally,  visual fields full to confrontation, pupils equal round and reactive to light [Cranial Nerves Oculomotor (III)] : extraocular motion intact [Cranial Nerves Trigeminal (V)] : facial sensation intact symmetrically [Cranial Nerves Facial (VII)] : face symmetrical [Cranial Nerves Vestibulocochlear (VIII)] : hearing was intact bilaterally [Cranial Nerves Glossopharyngeal (IX)] : tongue and palate midline [Cranial Nerves Accessory (XI - Cranial And Spinal)] : head turning and shoulder shrug symmetric [No Muscle Atrophy] : normal bulk in all four extremities [Cranial Nerves Hypoglossal (XII)] : there was no tongue deviation with protrusion [Motor Tone] : muscle tone was normal in all four extremities [Sensation Tactile Decrease] : light touch was intact [Balance] : balance was intact [2+] : Ankle jerk left 2+ [Sclera] : the sclera and conjunctiva were normal [PERRL With Normal Accommodation] : pupils were equal in size, round, reactive to light, with normal accommodation [Extraocular Movements] : extraocular movements were intact [Outer Ear] : the ears and nose were normal in appearance [Full Pulse] : the pedal pulses are present [Edema] : there was no peripheral edema [Both Tympanic Membranes Were Examined] : both tympanic membranes were normal [Past-pointing] : there was no past-pointing [Tremor] : no tremor present [Plantar Reflex Right Only] : normal on the right [Plantar Reflex Left Only] : normal on the left [FreeTextEntry6] : 5/5 rue/rle 5/5 on left except 4/5 hamstrings and dorsiflexion. [FreeTextEntry8] : slight circumferencial gait on left

## 2024-05-12 NOTE — DISCUSSION/SUMMARY
[Medically Refractory (seizure within the last year)] : Medically Refractory (seizure within the last year) [Complex Partial] : complex partial [Secondary Generalization] : secondary generalization [Focal] : focal [Safety Recommendations] : The patient was advised in regards to the risk of seizures and general seizure safety recommendations including not to be bathing alone, climbing to high places and operating heavy machinery. [Risks Associated with Driving/[place] Law] : As per my usual protocol, the patient was advised in regards to risks and driving privileges associated with the [place] Guidelines.  [Compliance with Medications] : The importance of compliance with medications was reinforced. [Medication Side Effects] : High frequency and serious potential medication adverse effects were reviewed with the patient, including but not exclusive to psychiatric effects.  Information sheets on medication side effects were made available to the patient in our clinic.  The patient or advocate agrees to notify us for any concerns. [Bone Health Education] : Patient was educated in regards to bone health and an increased risk of osteoporosis in patients with epilepsy. [Sleep Hygiene/Sleep Disruption Risks] : Sleep hygiene and the risks of sleep disruption were discussed. [Surgical Options/Risks/Benefits] : Surgical options/risks/benefits for intractable epilepsy were discussed. [Risk of Death] : Risk of death associated with seizures / SUDEP was discussed. 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 Some left lower limb weakness noted. EEG with one seizure and bifrontal discharges.  Plan continue current ASM regimen reviewed seizure triggers/safety follow up in 3 months   may be an RNS candidate

## 2024-05-20 ENCOUNTER — TRANSCRIPTION ENCOUNTER (OUTPATIENT)
Age: 24
End: 2024-05-20

## 2024-05-24 ENCOUNTER — APPOINTMENT (OUTPATIENT)
Dept: NEUROLOGY | Facility: CLINIC | Age: 24
End: 2024-05-24
Payer: OTHER GOVERNMENT

## 2024-05-24 VITALS
BODY MASS INDEX: 24.36 KG/M2 | WEIGHT: 200 LBS | DIASTOLIC BLOOD PRESSURE: 81 MMHG | SYSTOLIC BLOOD PRESSURE: 153 MMHG | HEART RATE: 95 BPM | HEIGHT: 76 IN

## 2024-05-24 PROCEDURE — 99215 OFFICE O/P EST HI 40 MIN: CPT

## 2024-05-24 PROCEDURE — G2211 COMPLEX E/M VISIT ADD ON: CPT

## 2024-05-24 NOTE — HISTORY OF PRESENT ILLNESS
[FreeTextEntry1] : **Update 5/24/2024**  Patient comes in today with some improvement in his episodes. However, family has noted increased irritability and anger. When speaking with Lobito, he feels little emotion overall. He states that he doesn't feel anxious/angry or depressed. He is not interesting in epilepsy surgery at this time since does not want to risk surgery for possibly driving in future.  ***UPDATE:2/22/2024*** Appointment was conducted by audiovisual/telehealth at request of patient in place of a follow up office visit due to heightened concern for Corona virus infection risk. Verbal consent given on Feb 22, 2024 0:02 PM by the patient Mr. LOBITO MUSA Oct 15 2000 who understands that the telephone visit will be charged to insurance and may involve co-pay for patient Nurse Practitioner location:office Patient location:home Individuals on call: MAYRA Clarke, Mr. LOBITO Musa continues to do well with no interval seizures     Lamotrigine ER 200mg BID, Clobazam 10/20 and Divalproex ER 500mg  3 tabs BID  ******* Lobito Musa is a 23y year old man with a history of sinus infection at age 18 with secondary meningitis requiring brain surgery who comes in for epilepsy. 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 Since onset, has had short stares/grunts with no GTCs in several years. Has been tried on several ASMs (of which names cannot be recalled) and is currently on Onfi 10/20, VPA 1500bid (ER), LTG  bid.   Saw physician who recently increased VPA ER from 1500 daily to bid with no change in his LTG. On questioning, does have slight tremor. Has several short staring seizures per month which were 10-20/day at worst and more recently a few times per week. After being increased on VPA, had no further episodes until missed Onfi dose two days ago when had a few episodes.   Has spoken to neurologists in past with question of epilepsy surgery. Has not had SEEG/intracranial monitoring. Imaging done in past, but has recently moved from [place] to [place] and briefly in [place] for 6 months due to father's work.   PMHx: as above SocHx: does not drive FHx: no history seizures All: NKDA Meds: as above

## 2024-05-24 NOTE — DISCUSSION/SUMMARY
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 Some left lower limb weakness noted. EEG with one seizure and bifrontal discharges on initial visit with possible right evolution.  Plan continue current ASM regimen reviewed seizure triggers/safety For his mood, mainly flat affect which is more likely from underlying right frontal infarct/injury than medication. His medications would not likely cause irritability since LTG and VPA are actually both mood stabilizers. I would not want to use SSRI since no feeling of anger/anxiety or depression at this time. I think it would be best to start with therapy and will put him in touch with our . Will f/u in 2-3 months.  Of note, we have permission to speak with his family when needed.  Total time 40 min.  [Medically Refractory (seizure within the last year)] : Medically Refractory (seizure within the last year) [Complex Partial] : complex partial [Secondary Generalization] : secondary generalization [Focal] : focal [[place]isks Associated with D[place] Law] : As per my usual protocol, the patient was advised in regards to risks and driving privileges associated with the [place] Guidelines.  [Safety Recommendations] : The patient was advised in regards to the risk of seizures and general seizure safety recommendations including not to be bathing alone, climbing to high places and operating heavy machinery. [Compliance with Medications] : The importance of compliance with medications was reinforced. [Medication Side Effects] : High frequency and serious potential medication adverse effects were reviewed with the patient, including but not exclusive to psychiatric effects.  Information sheets on medication side effects were made available to the patient in our clinic.  The patient or advocate agrees to notify us for any concerns. [Bone Health Education] : Patient was educated in regards to bone health and an increased risk of osteoporosis in patients with epilepsy. [Sleep Hygiene/Sleep Disruption Risks] : Sleep hygiene and the risks of sleep disruption were discussed. [Surgical Options/Risks/Benefits] : Surgical options/risks/benefits for intractable epilepsy were discussed. [Risk of Death] : Risk of death associated with seizures / SUDEP was discussed.

## 2024-05-24 NOTE — PHYSICAL EXAM
[Oriented To Time, Place, And Person] : oriented to person, place, and time [Affect] : the affect was normal [Impaired Insight] : insight and judgment were intact [Person] : oriented to person [Place] : oriented to place [Time] : oriented to time [Concentration Intact] : normal concentrating ability [Visual Intact] : visual attention was ~T not ~L decreased [Naming Objects] : no difficulty naming common objects [Repeating Phrases] : no difficulty repeating a phrase [Writing A Sentence] : no difficulty writing a sentence [Fluency] : fluency intact [Comprehension] : comprehension intact [Reading] : reading intact [Past History] : adequate knowledge of personal past history [Cranial Nerves Optic (II)] : visual acuity intact bilaterally,  visual fields full to confrontation, pupils equal round and reactive to light [Cranial Nerves Oculomotor (III)] : extraocular motion intact [Cranial Nerves Trigeminal (V)] : facial sensation intact symmetrically [Cranial Nerves Facial (VII)] : face symmetrical [Cranial Nerves Vestibulocochlear (VIII)] : hearing was intact bilaterally [Cranial Nerves Glossopharyngeal (IX)] : tongue and palate midline [Cranial Nerves Accessory (XI - Cranial And Spinal)] : head turning and shoulder shrug symmetric [Cranial Nerves Hypoglossal (XII)] : there was no tongue deviation with protrusion [Motor Tone] : muscle tone was normal in all four extremities [No Muscle Atrophy] : normal bulk in all four extremities [Sensation Tactile Decrease] : light touch was intact [Balance] : balance was intact [Tremor] : no tremor present [Past-pointing] : there was no past-pointing [2+] : Ankle jerk left 2+ [Plantar Reflex Right Only] : normal on the right [Plantar Reflex Left Only] : normal on the left [FreeTextEntry6] : 5/5 rue/rle 5/5 on left except 4/5 hamstrings and dorsiflexion. [FreeTextEntry8] : slight circumferencial gait on left [Sclera] : the sclera and conjunctiva were normal [PERRL With Normal Accommodation] : pupils were equal in size, round, reactive to light, with normal accommodation [Extraocular Movements] : extraocular movements were intact [Outer Ear] : the ears and nose were normal in appearance [Both Tympanic Membranes Were Examined] : both tympanic membranes were normal [Full Pulse] : the pedal pulses are present [Edema] : there was no peripheral edema

## 2024-06-06 ENCOUNTER — TRANSCRIPTION ENCOUNTER (OUTPATIENT)
Age: 24
End: 2024-06-06

## 2024-06-10 RX ORDER — CLOBAZAM 10 MG/1
10 TABLET ORAL
Qty: 90 | Refills: 0 | Status: ACTIVE | COMMUNITY
Start: 2023-11-07 | End: 1900-01-01

## 2024-07-03 ENCOUNTER — NON-APPOINTMENT (OUTPATIENT)
Age: 24
End: 2024-07-03

## 2024-07-08 ENCOUNTER — TRANSCRIPTION ENCOUNTER (OUTPATIENT)
Age: 24
End: 2024-07-08

## 2024-07-16 ENCOUNTER — NON-APPOINTMENT (OUTPATIENT)
Age: 24
End: 2024-07-16

## 2024-07-17 ENCOUNTER — TRANSCRIPTION ENCOUNTER (OUTPATIENT)
Age: 24
End: 2024-07-17

## 2024-07-19 DIAGNOSIS — M62.81 MUSCLE WEAKNESS (GENERALIZED): ICD-10-CM

## 2024-07-19 DIAGNOSIS — G03.9 MENINGITIS, UNSPECIFIED: ICD-10-CM

## 2024-07-22 ENCOUNTER — TRANSCRIPTION ENCOUNTER (OUTPATIENT)
Age: 24
End: 2024-07-22

## 2024-07-25 ENCOUNTER — TRANSCRIPTION ENCOUNTER (OUTPATIENT)
Age: 24
End: 2024-07-25

## 2024-07-29 ENCOUNTER — RX RENEWAL (OUTPATIENT)
Age: 24
End: 2024-07-29

## 2024-07-29 ENCOUNTER — TRANSCRIPTION ENCOUNTER (OUTPATIENT)
Age: 24
End: 2024-07-29

## 2024-08-01 ENCOUNTER — NON-APPOINTMENT (OUTPATIENT)
Age: 24
End: 2024-08-01

## 2024-08-02 ENCOUNTER — TRANSCRIPTION ENCOUNTER (OUTPATIENT)
Age: 24
End: 2024-08-02

## 2024-08-09 ENCOUNTER — TRANSCRIPTION ENCOUNTER (OUTPATIENT)
Age: 24
End: 2024-08-09

## 2024-09-10 ENCOUNTER — TRANSCRIPTION ENCOUNTER (OUTPATIENT)
Age: 24
End: 2024-09-10

## 2024-10-08 ENCOUNTER — TRANSCRIPTION ENCOUNTER (OUTPATIENT)
Age: 24
End: 2024-10-08

## 2024-10-10 ENCOUNTER — TRANSCRIPTION ENCOUNTER (OUTPATIENT)
Age: 24
End: 2024-10-10

## 2024-11-05 ENCOUNTER — TRANSCRIPTION ENCOUNTER (OUTPATIENT)
Age: 24
End: 2024-11-05

## 2024-12-04 ENCOUNTER — TRANSCRIPTION ENCOUNTER (OUTPATIENT)
Age: 24
End: 2024-12-04

## 2024-12-05 ENCOUNTER — TRANSCRIPTION ENCOUNTER (OUTPATIENT)
Age: 24
End: 2024-12-05

## 2025-01-08 ENCOUNTER — TRANSCRIPTION ENCOUNTER (OUTPATIENT)
Age: 25
End: 2025-01-08